# Patient Record
Sex: FEMALE | Race: WHITE
[De-identification: names, ages, dates, MRNs, and addresses within clinical notes are randomized per-mention and may not be internally consistent; named-entity substitution may affect disease eponyms.]

---

## 2019-07-30 ENCOUNTER — HOSPITAL ENCOUNTER (EMERGENCY)
Dept: HOSPITAL 8 - ED | Age: 24
Discharge: HOME | End: 2019-07-30
Payer: MEDICAID

## 2019-07-30 VITALS — WEIGHT: 123.68 LBS | HEIGHT: 67 IN | BODY MASS INDEX: 19.41 KG/M2

## 2019-07-30 VITALS — SYSTOLIC BLOOD PRESSURE: 116 MMHG | DIASTOLIC BLOOD PRESSURE: 68 MMHG

## 2019-07-30 DIAGNOSIS — S91.331A: Primary | ICD-10-CM
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
## 2019-07-30 DIAGNOSIS — S91.332A: ICD-10-CM
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
## 2019-07-30 PROCEDURE — 90471 IMMUNIZATION ADMIN: CPT

## 2019-07-30 PROCEDURE — 90715 TDAP VACCINE 7 YRS/> IM: CPT

## 2019-09-08 ENCOUNTER — HOSPITAL ENCOUNTER (EMERGENCY)
Dept: HOSPITAL 8 - ED | Age: 24
Discharge: HOME | End: 2019-09-08
Payer: MEDICAID

## 2019-09-08 VITALS — SYSTOLIC BLOOD PRESSURE: 134 MMHG | DIASTOLIC BLOOD PRESSURE: 77 MMHG

## 2019-09-08 VITALS — HEIGHT: 67 IN | WEIGHT: 127.87 LBS | BODY MASS INDEX: 20.07 KG/M2

## 2019-09-08 DIAGNOSIS — S60.512A: ICD-10-CM

## 2019-09-08 DIAGNOSIS — F17.200: ICD-10-CM
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
## 2019-09-08 DIAGNOSIS — S80.212A: ICD-10-CM

## 2019-09-08 DIAGNOSIS — F15.10: ICD-10-CM

## 2019-09-08 DIAGNOSIS — Z72.9: ICD-10-CM

## 2019-09-08 DIAGNOSIS — S50.311A: ICD-10-CM

## 2019-09-08 DIAGNOSIS — S82.52XA: Primary | ICD-10-CM
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
## 2019-09-08 DIAGNOSIS — S80.211A: ICD-10-CM
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
## 2019-09-08 DIAGNOSIS — S60.511A: ICD-10-CM

## 2019-09-08 DIAGNOSIS — S50.312A: ICD-10-CM

## 2019-09-08 PROCEDURE — 99284 EMERGENCY DEPT VISIT MOD MDM: CPT

## 2019-09-08 PROCEDURE — 93005 ELECTROCARDIOGRAM TRACING: CPT

## 2019-09-08 PROCEDURE — 96374 THER/PROPH/DIAG INJ IV PUSH: CPT

## 2019-09-08 PROCEDURE — 96375 TX/PRO/DX INJ NEW DRUG ADDON: CPT

## 2019-09-08 PROCEDURE — 29125 APPL SHORT ARM SPLINT STATIC: CPT

## 2019-09-08 PROCEDURE — 73110 X-RAY EXAM OF WRIST: CPT

## 2019-09-08 PROCEDURE — 73070 X-RAY EXAM OF ELBOW: CPT

## 2019-09-08 PROCEDURE — 99283 EMERGENCY DEPT VISIT LOW MDM: CPT

## 2019-09-08 PROCEDURE — 73610 X-RAY EXAM OF ANKLE: CPT

## 2019-09-08 PROCEDURE — 73564 X-RAY EXAM KNEE 4 OR MORE: CPT

## 2019-09-08 PROCEDURE — 96372 THER/PROPH/DIAG INJ SC/IM: CPT

## 2019-09-08 PROCEDURE — 73130 X-RAY EXAM OF HAND: CPT

## 2019-09-08 PROCEDURE — 12031 INTMD RPR S/A/T/EXT 2.5 CM/<: CPT

## 2019-09-08 NOTE — NUR
PER PT AND BOYFRIEND, EPISODE OF SYNCOPE.  PT A/O TO NORM SINCE ARRIVAL TO ED.  
PT'S BEHAVIOR CONTINUES TO BE INNAPPROPRIATE AND ERRATIC WITH EPISODES OF 
CRYING AND YELLING OUT.

## 2019-09-08 NOTE — NUR
" I SMOKED SOME METH TONIGHT, I DID'T MEAN TOO, IT WAS JUST THERE AND I HAVE 
BEEN CLEAN ".  PT FULLY MONITORED. VS Q 15 M.  /77 HR 94 SINUS.

## 2019-09-08 NOTE — NUR
PT BACK FROM XRAY, REFUSES IV.  ERP NOTIFIED, MORPHINE GIVEN IM AND ZOFRAN ODT. 
 BOYFRIEND AT , CALL LIGHT WITHIN REACH.

## 2019-09-09 ENCOUNTER — HOSPITAL ENCOUNTER (EMERGENCY)
Dept: HOSPITAL 8 - ED | Age: 24
Discharge: HOME | End: 2019-09-09
Payer: MEDICAID

## 2019-09-09 VITALS — DIASTOLIC BLOOD PRESSURE: 66 MMHG | SYSTOLIC BLOOD PRESSURE: 94 MMHG

## 2019-09-09 VITALS — BODY MASS INDEX: 17.99 KG/M2 | HEIGHT: 67 IN | WEIGHT: 114.64 LBS

## 2019-09-09 DIAGNOSIS — Z72.9: ICD-10-CM
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
## 2019-09-09 DIAGNOSIS — S82.54XA: Primary | ICD-10-CM
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
## 2019-09-09 PROCEDURE — 99283 EMERGENCY DEPT VISIT LOW MDM: CPT

## 2019-09-09 PROCEDURE — 29515 APPLICATION SHORT LEG SPLINT: CPT

## 2020-07-25 ENCOUNTER — HOSPITAL ENCOUNTER (EMERGENCY)
Dept: HOSPITAL 8 - ED | Age: 25
Discharge: LEFT BEFORE DISCHARGE | End: 2020-07-25
Payer: MEDICAID

## 2020-07-25 VITALS — SYSTOLIC BLOOD PRESSURE: 118 MMHG | DIASTOLIC BLOOD PRESSURE: 58 MMHG

## 2020-07-25 VITALS — HEIGHT: 68 IN | BODY MASS INDEX: 17.21 KG/M2 | WEIGHT: 113.54 LBS
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
## 2020-07-25 DIAGNOSIS — R30.0: ICD-10-CM
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
## 2020-07-25 DIAGNOSIS — S91.332A: Primary | ICD-10-CM

## 2020-07-25 LAB — MICROSCOPIC: (no result)

## 2020-07-25 PROCEDURE — 99284 EMERGENCY DEPT VISIT MOD MDM: CPT

## 2020-07-25 PROCEDURE — 81001 URINALYSIS AUTO W/SCOPE: CPT

## 2020-07-25 PROCEDURE — 87086 URINE CULTURE/COLONY COUNT: CPT

## 2021-01-12 ENCOUNTER — HOSPITAL ENCOUNTER (EMERGENCY)
Dept: HOSPITAL 8 - ED | Age: 26
Discharge: HOME | End: 2021-01-12
Payer: MEDICAID

## 2021-01-12 VITALS — WEIGHT: 118.83 LBS | HEIGHT: 68 IN | BODY MASS INDEX: 18.01 KG/M2

## 2021-01-12 VITALS — SYSTOLIC BLOOD PRESSURE: 131 MMHG | DIASTOLIC BLOOD PRESSURE: 99 MMHG

## 2021-01-12 DIAGNOSIS — Z90.89: ICD-10-CM

## 2021-01-12 DIAGNOSIS — H66.001: Primary | ICD-10-CM

## 2021-01-12 DIAGNOSIS — H61.21: ICD-10-CM

## 2021-01-12 PROCEDURE — 69210 REMOVE IMPACTED EAR WAX UNI: CPT

## 2021-01-12 PROCEDURE — 99284 EMERGENCY DEPT VISIT MOD MDM: CPT

## 2021-01-12 PROCEDURE — 99283 EMERGENCY DEPT VISIT LOW MDM: CPT

## 2021-01-27 ENCOUNTER — HOSPITAL ENCOUNTER (EMERGENCY)
Dept: HOSPITAL 8 - ED | Age: 26
Discharge: HOME | End: 2021-01-27
Payer: MEDICAID

## 2021-01-27 VITALS — BODY MASS INDEX: 17.88 KG/M2 | WEIGHT: 117.95 LBS | HEIGHT: 68 IN

## 2021-01-27 VITALS — SYSTOLIC BLOOD PRESSURE: 115 MMHG | DIASTOLIC BLOOD PRESSURE: 85 MMHG

## 2021-01-27 DIAGNOSIS — Z90.89: ICD-10-CM

## 2021-01-27 DIAGNOSIS — Z20.2: Primary | ICD-10-CM

## 2021-01-27 PROCEDURE — 96372 THER/PROPH/DIAG INJ SC/IM: CPT

## 2021-01-27 PROCEDURE — 99283 EMERGENCY DEPT VISIT LOW MDM: CPT

## 2021-04-05 ENCOUNTER — HOSPITAL ENCOUNTER (EMERGENCY)
Dept: HOSPITAL 8 - ED | Age: 26
Discharge: HOME | End: 2021-04-05
Payer: MEDICAID

## 2021-04-05 VITALS — WEIGHT: 112.44 LBS | BODY MASS INDEX: 17.04 KG/M2 | HEIGHT: 68 IN

## 2021-04-05 VITALS — DIASTOLIC BLOOD PRESSURE: 94 MMHG | SYSTOLIC BLOOD PRESSURE: 137 MMHG

## 2021-04-05 DIAGNOSIS — B86: Primary | ICD-10-CM

## 2021-04-05 PROCEDURE — 99283 EMERGENCY DEPT VISIT LOW MDM: CPT
